# Patient Record
Sex: MALE | Race: WHITE | Employment: PART TIME | URBAN - METROPOLITAN AREA
[De-identification: names, ages, dates, MRNs, and addresses within clinical notes are randomized per-mention and may not be internally consistent; named-entity substitution may affect disease eponyms.]

---

## 2024-04-13 ENCOUNTER — APPOINTMENT (OUTPATIENT)
Dept: CT IMAGING | Age: 59
End: 2024-04-13
Payer: COMMERCIAL

## 2024-04-13 ENCOUNTER — HOSPITAL ENCOUNTER (EMERGENCY)
Age: 59
Discharge: HOME | End: 2024-04-13
Attending: EMERGENCY MEDICINE
Payer: COMMERCIAL

## 2024-04-13 VITALS
RESPIRATION RATE: 18 BRPM | BODY MASS INDEX: 22.1 KG/M2 | HEIGHT: 72 IN | OXYGEN SATURATION: 99 % | SYSTOLIC BLOOD PRESSURE: 155 MMHG | TEMPERATURE: 99.1 F | WEIGHT: 163.14 LBS | HEART RATE: 77 BPM | DIASTOLIC BLOOD PRESSURE: 88 MMHG

## 2024-04-13 DIAGNOSIS — S09.90XA CLOSED HEAD INJURY, INITIAL ENCOUNTER: Primary | ICD-10-CM

## 2024-04-13 DIAGNOSIS — S00.83XA CONTUSION OF FACE, INITIAL ENCOUNTER: ICD-10-CM

## 2024-04-13 PROCEDURE — 70486 CT MAXILLOFACIAL W/O DYE: CPT

## 2024-04-13 PROCEDURE — 70450 CT HEAD/BRAIN W/O DYE: CPT

## 2024-04-13 PROCEDURE — 99284 EMERGENCY DEPT VISIT MOD MDM: CPT

## 2024-04-13 RX ORDER — TRAMADOL HYDROCHLORIDE 50 MG/1
50 TABLET ORAL EVERY 6 HOURS PRN
Qty: 12 TABLET | Refills: 0 | Status: SHIPPED | OUTPATIENT
Start: 2024-04-13 | End: 2024-04-16

## 2024-04-13 ASSESSMENT — PAIN SCALES - GENERAL: PAINLEVEL_OUTOF10: 3

## 2024-04-13 ASSESSMENT — PAIN DESCRIPTION - DESCRIPTORS: DESCRIPTORS: ACHING

## 2024-04-13 ASSESSMENT — PAIN DESCRIPTION - PAIN TYPE: TYPE: ACUTE PAIN

## 2024-04-13 ASSESSMENT — PAIN - FUNCTIONAL ASSESSMENT: PAIN_FUNCTIONAL_ASSESSMENT: 0-10

## 2024-04-13 NOTE — ED PROVIDER NOTES
[facility] Emergency Department  [address]  [facility] [address]  Phone: [phone]  Fax: [phone]      Pt Name: Ted Melgoza  MRN:[id number]  Birthdate 1965  Date of evaluation: 4/13/2024      CHIEF COMPLAINT       Chief Complaint   Patient presents with    Head Injury    Facial Injury     Blunt injury to face and head.  Onset about 5 days ago.  Pt fell about 5 feet off hay-stack and hit face on a snowblower.  Pt complains of nagging headaches, photosensitivity, localized pain to both eyes.         HISTORY OF PRESENT ILLNESS    58-year-old male presents to the emergency department today complaining of right-sided facial pain.  He reports this past Tuesday he was standing on a bale of hay when he fell off.  The snowblower is what stopped his fall according to the patient.  Struck in the right side of his face.  Denies any loss conscious.  No otorrhea or rhinorrhea.  He denies any neck pain.  No distal deficits but he does report ever since then that he has been intermittently confused and complains of right-sided facial pain.  He reports that there was a laceration in his right cheek that which did bleed a lot but stopped without any significant intervention.  He presents here because of continued photosensitivity and pain to the area.  There is been no other contemporaneous evaluation or management of the symptoms prior to arrival.     REVIEWOF SYSTEMS     Review of Systems   All other systems reviewed and are negative.        PAST MEDICAL HISTORY    has no past medical history on file.    SURGICAL HISTORY      has no past surgical history on file.    CURRENTMEDICATIONS       Previous Medications    No medications on file       ALLERGIES     has No Known Allergies.    FAMILY HISTORY     has no family status information on file.      family history is not on file.    SOCIAL HISTORY          PHYSICAL EXAM     INITIAL VITALS:  height is 1.82 m (5' 11.65\") and weight is 74 kg  intracranial abnormalities     CT MAXILLOFACIAL WO CONTRAST    Result Date: 4/13/2024  EXAMINATION: CT OF THE HEAD WITHOUT CONTRAST; CT OF THE FACE WITHOUT CONTRAST  4/13/2024 12:51 pm TECHNIQUE: CT of the head was performed without the administration of intravenous contrast. Automated exposure control, iterative reconstruction, and/or weight based adjustment of the mA/kV was utilized to reduce the radiation dose to as low as reasonably achievable.; CT of the face was performed without the administration of intravenous contrast. Multiplanar reformatted images are provided for review. Automated exposure control, iterative reconstruction, and/or weight based adjustment of the mA/kV was utilized to reduce the radiation dose to as low as reasonably achievable. COMPARISON: None. HISTORY: ORDERING SYSTEM PROVIDED HISTORY: chi TECHNOLOGIST PROVIDED HISTORY: chi Decision Support Exception - unselect if not a suspected or confirmed emergency medical condition->Emergency Medical Condition (MA) Reason for Exam: Patient states fall 4 days ago hit right orbit area, sensitive to light; ORDERING SYSTEM PROVIDED HISTORY: facial trauma TECHNOLOGIST PROVIDED HISTORY: facial trauma Decision Support Exception - unselect if not a suspected or confirmed emergency medical condition->Emergency Medical Condition (MA) Reason for Exam: Patient states fall 4 days ago hit right orbit area, sensitive to light CT BRAIN AND FACIAL BONES FINDINGS: BRAIN/VENTRICLES: The cerebral hemispheres, brainstem, and cerebellum have a normal appearance . The falx is midline. The ventricles and peripheral sulci are normal. There is no sign of a space occupying lesion, infarction, or hemorrhage. FACIAL BONES: The frontal sinuses, orbital walls, maxilla, pterygoid plates, zygomatic arches, hard palate, nasal bones and mandible are intact.  The temporomandibular joints are aligned. ORBITAL CONTENTS: The globes appear intact.  The extraocular muscles, optic nerve